# Patient Record
Sex: FEMALE | Race: WHITE | URBAN - METROPOLITAN AREA
[De-identification: names, ages, dates, MRNs, and addresses within clinical notes are randomized per-mention and may not be internally consistent; named-entity substitution may affect disease eponyms.]

---

## 2017-11-29 ENCOUNTER — TRANSFERRED RECORDS (OUTPATIENT)
Dept: HEALTH INFORMATION MANAGEMENT | Facility: CLINIC | Age: 27
End: 2017-11-29

## 2018-01-04 ENCOUNTER — TRANSFERRED RECORDS (OUTPATIENT)
Dept: HEALTH INFORMATION MANAGEMENT | Facility: CLINIC | Age: 28
End: 2018-01-04

## 2018-03-01 ENCOUNTER — TRANSFERRED RECORDS (OUTPATIENT)
Dept: HEALTH INFORMATION MANAGEMENT | Facility: CLINIC | Age: 28
End: 2018-03-01

## 2018-03-29 ENCOUNTER — OFFICE VISIT (OUTPATIENT)
Dept: OTOLARYNGOLOGY | Facility: CLINIC | Age: 28
End: 2018-03-29
Payer: COMMERCIAL

## 2018-03-29 VITALS — HEIGHT: 62 IN | WEIGHT: 140 LBS | BODY MASS INDEX: 25.76 KG/M2

## 2018-03-29 DIAGNOSIS — J34.89 LESION OR MASS OF PARANASAL SINUSES: Primary | ICD-10-CM

## 2018-03-29 PROBLEM — F41.1 GAD (GENERALIZED ANXIETY DISORDER): Status: ACTIVE | Noted: 2017-11-17

## 2018-03-29 PROBLEM — G89.29 CHRONIC INTRACTABLE HEADACHE: Status: ACTIVE | Noted: 2017-11-17

## 2018-03-29 PROBLEM — Z76.5 DRUG-SEEKING BEHAVIOR: Status: ACTIVE | Noted: 2018-02-09

## 2018-03-29 PROBLEM — R51.9 CHRONIC INTRACTABLE HEADACHE: Status: ACTIVE | Noted: 2017-11-17

## 2018-03-29 RX ORDER — ALBUTEROL SULFATE 90 UG/1
2 AEROSOL, METERED RESPIRATORY (INHALATION)
COMMUNITY
Start: 2018-03-14

## 2018-03-29 RX ORDER — CLONAZEPAM 1 MG/1
1 TABLET ORAL
COMMUNITY
Start: 2018-02-07

## 2018-03-29 RX ORDER — METHOCARBAMOL 500 MG/1
500 TABLET, FILM COATED ORAL
COMMUNITY
Start: 2018-01-03

## 2018-03-29 ASSESSMENT — PAIN SCALES - GENERAL: PAINLEVEL: MODERATE PAIN (4)

## 2018-03-29 NOTE — LETTER
3/29/2018       RE: Shereen Estrada  [address] [facility] [address]     Dear Colleague,    Thank you for referring your patient, Shereen Estrada, to the [facility] EAR NOSE AND THROAT at [facility]. Please see a copy of my visit note below.    Dear Dr. Shahid:    I had the pleasure of meeting Shereen Estrada in consultation today at the [facility] Otolaryngology [facility] at your request.     DIAGNOSIS:  Right frontal sinus  mass.      HISTORY OF PRESENT ILLNESS:  Ms. Estrada is a 27-year-old female who has been experiencing right frontal headaches.  The patient describes this as migraines.  She has being experiencing this for the last couple of years.  Finally, an MRI was obtained and demonstrated a right frontal sinus lesion.  The patient denies any visual changes.  No symptoms of rhinorrhea. She states that she also has allergies to cats and dogs and she takes Zyrtec for this with some improvement.  She states that also she has about two episodes of sinusitis a year.  She denies any other new symptoms.        MEDICATIONS:     Current Outpatient Prescriptions   Medication Sig Dispense Refill     albuterol (PROAIR HFA/PROVENTIL HFA/VENTOLIN HFA) 108 (90 BASE) MCG/ACT Inhaler Inhale 2 puffs into the lungs       cetirizine HCl 10 MG CAPS        clonazePAM (KLONOPIN) 1 MG tablet Take 1 mg by mouth       methocarbamol (ROBAXIN) 500 MG tablet Take 500 mg by mouth         ALLERGIES:    Allergies   Allergen Reactions     Cephalexin      Naproxen Diarrhea     Other reaction(s): Other - Describe In Comment Field  Abdominal pain       HABITS/SOCIAL HISTORY: No smoking, no drinking    PAST MEDICAL HISTORY: No past medical history on file.     FAMILY HISTORY:  No family history on file.Not contributory    REVIEW OF SYSTEMS:  12 point ROS was negative other than the symptoms noted above in the HPI.    PHYSICAL EXAMINATION:   Constitutional: The patient was well-groomed and in no acute  distress.    Skin: Warm and pink.    Neurologic: Alert and oriented x3. Cranial nerves III-XII within normal limits. Voice quality is normal.    Psychiatric: The patient's affect was calm, cooperative and appropriate.    Respiratory: Breathing comfortably without stridor or exertion of accessory muscles.    Eyes: Pupils were equal and reactive. Extraocular movements are intact.    Head: Normocephalic and atraumatic. No lesions or scars.    Ears: External auditory canals and tympanic membranes were clear.    Nose: Sinuses were nontender. Anterior rhinoscopy revealed midline septum and absence of purulence or polyps.    Oral cavity/Oropharynx: Normal tongue, floor of mouth, buccal mucosa and palate. No lesions or masses on inspection or palpation. No abnormal lymph tissue in the oropharynx.    Neck: The parotids are soft without masses. Supple with normal laryngeal and tracheal landmarks.    Lymphatic: There is no palpable lymphadenopathy or other masses in the neck or parotids.       PROCEDURE:  Nasal endoscopy, I used a 0-degree nasal endoscope.  Septum is in the midline.  On the right side, inferior and middle turbinates are totally normal.  I do not see any evidence of masses or lesions.  The middle meatus looks totally normal.  On the left side, there is a normal inferior middle turbinate.  The nasopharynx is normal.      IMAGING:  The patient has an MRI from 1/4/2018.  The MRI shows a right frontal sinus mass that is slightly hyperintense on T1 and hypointense on T2.  There is clearly hyperintensity of the right ethmoid and right maxillary sinus on the T2 which appears to be fluid.  I think that the lesion on the right frontal sinus is a solid mass.      ASSESSMENT AND PLAN:  This is a 27-year-old female with a right frontal sinus mass on MRI scan. Unfortunately I do not have the CT scan today to delineate bony structures.        My recommendation is that I need to review the CT scan, but she is going to need a  biopsy with second stage for surgery versus trying to remove the right frontal sinus mass in one surgical approach with the caveat that we will not know the diagnosis during the surgery.  The patient wants to email me the CT scan and I will review it and then give her my final recommendations.       ECG/ms     Thank you very much for the opportunity to participate in the care of your patient.      Sincerely,    Shae Nassar MD

## 2018-03-29 NOTE — MR AVS SNAPSHOT
After Visit Summary   3/29/2018    Shereen Estrada    MRN: [id number]           Patient Information     Date Of Birth          1990        Visit Information        Provider Department      3/29/2018 3:00 PM Shae Nassar MD [facility] Ear Nose and Throat        Today's Diagnoses     Lesion or mass of paranasal sinuses    -  1      Care Instructions    1.  You were seen in the ENT Clinic today by Dr. Arroyo.  If you have any questions or concerns after your appointment, please call [phone].  Press option #1 for scheduling related needs.  Press option #3 for Nurse advice.  2.  Plan is for Dr. Arroyo to review the CT scan and proceed with developing a plan for surgery.     Michaela LION, RN  [facility]   Head & Neck Surgery               Follow-ups after your visit        Who to contact     Please call your clinic at [phone] to:    Ask questions about your health    Make or cancel appointments    Discuss your medicines    Learn about your test results    Speak to your doctor            Additional Information About Your Visit        [employer] Information     [employer] is an electronic gateway that provides easy, online access to your medical records. With [employer], you can request a clinic appointment, read your test results, renew a prescription or communicate with your care team.     To sign up for [employer] visit the website at [email or web address]   You will be asked to enter the access code listed below, as well as some personal information. Please follow the directions to create your username and password.     Your access code is: [email or web address]  Expires: 6/10/2018  9:56 AM     Your access code will  in 90 days. If you need help or a new code, please contact your [facility] or call [phone] for assistance.        Care EveryWhere ID     This is your Care EveryWhere ID. This could be used by other organizations to  "access your [facility] medical records  [id number]        Your Vitals Were     Height BMI (Body Mass Index)                1.58 m (5' 2.21\") 25.44 kg/m2           Blood Pressure from Last 3 Encounters:   No data found for BP    Weight from Last 3 Encounters:   03/29/18 63.5 kg (140 lb)              We Performed the Following     NASAL ENDOSCOPY, DIAGNOSTIC        Primary Care Provider    None Specified       No primary provider on file.        Equal Access to Services     [facility] : Hadii aad ku hadnegritao Sosuali, waaxda luqadaha, qaybta kaalmada adelynnda, waxghislaine robby allisonkarina mendosawillow lazcano lamartínkarina . So [facility] [phone].    ATENCIÓN: Si nandala espdelfino, tiene a hamilton disposición servicios gratuitos de asistencia lingüística. Llame al [phone].    We comply with applicable federal civil rights laws and [place] laws. We do not discriminate on the basis of race, color, national origin, age, disability, sex, sexual orientation, or gender identity.            Thank you!     Thank you for choosing [facility] EAR NOSE AND THROAT  for your care. Our goal is always to provide you with excellent care. Hearing back from our patients is one way we can continue to improve our services. Please take a few minutes to complete the written survey that you may receive in the mail after your visit with us. Thank you!             Your Updated Medication List - Protect others around you: Learn how to safely use, store and throw away your medicines at www.disposemymeds.org.          This list is accurate as of 3/29/18 11:59 PM.  Always use your most recent med list.                   Brand Name Dispense Instructions for use Diagnosis    albuterol 108 (90 BASE) MCG/ACT Inhaler    PROAIR HFA/PROVENTIL HFA/VENTOLIN HFA     Inhale 2 puffs into the lungs        cetirizine HCl 10 MG Caps           clonazePAM 1 MG tablet    klonoPIN     Take 1 mg by mouth        methocarbamol 500 MG tablet    ROBAXIN     Take 500 mg by mouth          "

## 2018-03-29 NOTE — PATIENT INSTRUCTIONS
1.  You were seen in the ENT Clinic today by Dr. Arroyo.  If you have any questions or concerns after your appointment, please call [phone].  Press option #1 for scheduling related needs.  Press option #3 for Nurse advice.  2.  Plan is for Dr. Arroyo to review the CT scan and proceed with developing a plan for surgery.     Michaela GAFFNEYN, RN  [facility]   Head & Neck Surgery

## 2018-03-29 NOTE — PROGRESS NOTES
Dear Dr. Shahid:    I had the pleasure of meeting Shereen Estrada in consultation today at the [facility] Otolaryngology [facility] at your request.     DIAGNOSIS:  Right frontal sinus  mass.      HISTORY OF PRESENT ILLNESS:  Ms. Estrada is a 27-year-old female who has been experiencing right frontal headaches.  The patient describes this as migraines.  She has being experiencing this for the last couple of years.  Finally, an MRI was obtained and demonstrated a right frontal sinus lesion.  The patient denies any visual changes.  No symptoms of rhinorrhea. She states that she also has allergies to cats and dogs and she takes Zyrtec for this with some improvement.  She states that also she has about two episodes of sinusitis a year.  She denies any other new symptoms.        MEDICATIONS:     Current Outpatient Prescriptions   Medication Sig Dispense Refill     albuterol (PROAIR HFA/PROVENTIL HFA/VENTOLIN HFA) 108 (90 BASE) MCG/ACT Inhaler Inhale 2 puffs into the lungs       cetirizine HCl 10 MG CAPS        clonazePAM (KLONOPIN) 1 MG tablet Take 1 mg by mouth       methocarbamol (ROBAXIN) 500 MG tablet Take 500 mg by mouth         ALLERGIES:    Allergies   Allergen Reactions     Cephalexin      Naproxen Diarrhea     Other reaction(s): Other - Describe In Comment Field  Abdominal pain       HABITS/SOCIAL HISTORY: No smoking, no drinking    PAST MEDICAL HISTORY: No past medical history on file.     FAMILY HISTORY:  No family history on file.Not contributory    REVIEW OF SYSTEMS:  12 point ROS was negative other than the symptoms noted above in the HPI.    PHYSICAL EXAMINATION:   Constitutional: The patient was well-groomed and in no acute distress.    Skin: Warm and pink.    Neurologic: Alert and oriented x3. Cranial nerves III-XII within normal limits. Voice quality is normal.    Psychiatric: The patient's affect was calm, cooperative and appropriate.    Respiratory: Breathing comfortably without stridor or  exertion of accessory muscles.    Eyes: Pupils were equal and reactive. Extraocular movements are intact.    Head: Normocephalic and atraumatic. No lesions or scars.    Ears: External auditory canals and tympanic membranes were clear.    Nose: Sinuses were nontender. Anterior rhinoscopy revealed midline septum and absence of purulence or polyps.    Oral cavity/Oropharynx: Normal tongue, floor of mouth, buccal mucosa and palate. No lesions or masses on inspection or palpation. No abnormal lymph tissue in the oropharynx.    Neck: The parotids are soft without masses. Supple with normal laryngeal and tracheal landmarks.    Lymphatic: There is no palpable lymphadenopathy or other masses in the neck or parotids.       PROCEDURE:  Nasal endoscopy, I used a 0-degree nasal endoscope.  Septum is in the midline.  On the right side, inferior and middle turbinates are totally normal.  I do not see any evidence of masses or lesions.  The middle meatus looks totally normal.  On the left side, there is a normal inferior middle turbinate.  The nasopharynx is normal.      IMAGING:  The patient has an MRI from 1/4/2018.  The MRI shows a right frontal sinus mass that is slightly hyperintense on T1 and hypointense on T2.  There is clearly hyperintensity of the right ethmoid and right maxillary sinus on the T2 which appears to be fluid.  I think that the lesion on the right frontal sinus is a solid mass.      ASSESSMENT AND PLAN:  This is a 27-year-old female with a right frontal sinus mass on MRI scan. Unfortunately I do not have the CT scan today to delineate bony structures.        My recommendation is that I need to review the CT scan, but she is going to need a biopsy with second stage for surgery versus trying to remove the right frontal sinus mass in one surgical approach with the caveat that we will not know the diagnosis during the surgery.  The patient wants to email me the CT scan and I will review it and then give her my  final recommendations.       ECG/ms     Thank you very much for the opportunity to participate in the care of your patient.      Shae Nassar M.D.  Otolaryngology- Head & Neck Surgery  [phone]

## 2018-03-29 NOTE — NURSING NOTE
"Chief Complaint   Patient presents with     Consult     sinus mass     Height 1.58 m (5' 2.21\"), weight 63.5 kg (140 lb).    Alex Ruiz LPN    "

## 2018-04-05 ENCOUNTER — CARE COORDINATION (OUTPATIENT)
Dept: OTOLARYNGOLOGY | Facility: CLINIC | Age: 28
End: 2018-04-05

## 2018-04-05 NOTE — PROGRESS NOTES
Call was placed to the patient today to discuss her CT scan which was read by Dr. Arroyo.  Unable to leave a VM as her mailbox was full.  Will attempt to call the patient again tomorrow.    KATELYNN Caraballo R.N.SMIRTA  Nurse Coordinator Head & Neck Surgery  [phone]  4/5/2018 4:40 PM       Call was placed to the patient again today.  Unable to leave a VM, mailbox is full.    KATELYNN Caraballo R.N.S.NAmanda  Nurse Coordinator Head & Neck Surgery  [phone]  4/13/2018 4:05 PM       Letter sent with recommendations    KATELYNN Caraballo R.N.S.NAmanda  Nurse Coordinator Head & Neck Surgery  [phone]  4/16/2018 11:27 AM

## 2018-04-06 ENCOUNTER — HOSPITAL ENCOUNTER (INPATIENT)
Dept: GENERAL RADIOLOGY | Facility: CLINIC | Age: 28
End: 2018-04-06
Attending: OTOLARYNGOLOGY

## 2018-04-16 NOTE — LETTER
April 16, 2018      TO: Shereen Estrada  [address] [place] [address]         Dear Shereen,      You were seen in the ENT Clinic by Dr. Arroyo on 3/29/18.  The plan after that visit was to review your sinus CT scan  and call you with the plan of care.  I have tried three times to call and discuss this with you but was unable to leave a voicemail.      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  This is a benign, non cancerous lesion.  No biopsy of this area is needed after scan review.  Dr. Arroyo has proposed two treatment options.  The first is to observe the osteoma on future scans to evaluate for growth.  The second option is a surgery that would involve an incision from ear to ear and is a bigger procedure than what was discussed during your clinic visit.      If you would like to return to clinic to discuss this further, please call to make an appointment, [phone] #1.      Sincerely,          Emma Bustamante RN Care Coordinator for Dr. Arroyo

## 2018-04-16 NOTE — PROGRESS NOTES
Call was to the patient again, unable to reach.  Letter was sent. No further calls will be made.    Emma Bustamante R.N., B.S.N.  Nurse Coordinator Head & Neck Surgery  [phone]  4/16/2018 11:24 AM